# Patient Record
Sex: FEMALE | Race: WHITE
[De-identification: names, ages, dates, MRNs, and addresses within clinical notes are randomized per-mention and may not be internally consistent; named-entity substitution may affect disease eponyms.]

---

## 2019-03-16 ENCOUNTER — HOSPITAL ENCOUNTER (EMERGENCY)
Dept: HOSPITAL 26 - MED | Age: 74
End: 2019-03-16
Payer: COMMERCIAL

## 2019-03-16 VITALS
SYSTOLIC BLOOD PRESSURE: 81 MMHG | BODY MASS INDEX: 13.31 KG/M2 | HEIGHT: 59 IN | DIASTOLIC BLOOD PRESSURE: 33 MMHG | WEIGHT: 66 LBS

## 2019-03-16 VITALS — SYSTOLIC BLOOD PRESSURE: 95 MMHG | DIASTOLIC BLOOD PRESSURE: 38 MMHG

## 2019-03-16 DIAGNOSIS — Z79.899: ICD-10-CM

## 2019-03-16 DIAGNOSIS — R57.1: ICD-10-CM

## 2019-03-16 DIAGNOSIS — Z79.82: ICD-10-CM

## 2019-03-16 DIAGNOSIS — N17.9: Primary | ICD-10-CM

## 2019-03-16 DIAGNOSIS — E07.9: ICD-10-CM

## 2019-03-16 DIAGNOSIS — E87.5: ICD-10-CM

## 2019-03-16 DIAGNOSIS — I10: ICD-10-CM

## 2019-03-16 DIAGNOSIS — E16.2: ICD-10-CM

## 2019-03-16 DIAGNOSIS — E86.0: ICD-10-CM

## 2019-03-16 DIAGNOSIS — F03.90: ICD-10-CM

## 2019-03-16 LAB
ALBUMIN FLD-MCNC: 1.7 G/DL (ref 3.4–5)
ANION GAP SERPL CALCULATED.3IONS-SCNC: 28.1 MMOL/L (ref 8–16)
AST SERPL-CCNC: 31 U/L (ref 15–37)
BASOPHILS # BLD AUTO: 0 K/UL (ref 0–0.22)
BASOPHILS NFR BLD AUTO: 0.1 % (ref 0–2)
BILIRUB SERPL-MCNC: 0.3 MG/DL (ref 0–1)
BUN SERPL-MCNC: 262 MG/DL (ref 7–18)
CHLORIDE SERPL-SCNC: 137 MMOL/L (ref 98–107)
CO2 SERPL-SCNC: 10.2 MMOL/L (ref 21–32)
CREAT SERPL-MCNC: 17.1 MG/DL (ref 0.6–1.3)
EOSINOPHIL # BLD AUTO: 0 K/UL (ref 0–0.4)
EOSINOPHIL NFR BLD AUTO: 0.1 % (ref 0–4)
ERYTHROCYTE [DISTWIDTH] IN BLOOD BY AUTOMATED COUNT: 17.5 % (ref 11.6–13.7)
GFR SERPL CREATININE-BSD FRML MDRD: (no result) ML/MIN (ref 90–?)
GLUCOSE SERPL-MCNC: 224 MG/DL (ref 74–106)
HCT VFR BLD AUTO: 23.9 % (ref 36–48)
HGB BLD-MCNC: 7.3 G/DL (ref 12–16)
LYMPHOCYTES # BLD AUTO: 0.6 K/UL (ref 2.5–16.5)
LYMPHOCYTES NFR BLD AUTO: 9.3 % (ref 20.5–51.1)
MCH RBC QN AUTO: 29 PG (ref 27–31)
MCHC RBC AUTO-ENTMCNC: 30 G/DL (ref 33–37)
MCV RBC AUTO: 94.5 FL (ref 80–94)
MONOCYTES # BLD AUTO: 0.1 K/UL (ref 0.8–1)
MONOCYTES NFR BLD AUTO: 1.4 % (ref 1.7–9.3)
NEUTROPHILS # BLD AUTO: 5.8 K/UL (ref 1.8–7.7)
NEUTROPHILS NFR BLD AUTO: 89.1 % (ref 42.2–75.2)
PLATELET # BLD AUTO: 119 K/UL (ref 140–450)
POTASSIUM SERPL-SCNC: 8.3 MMOL/L (ref 3.5–5.1)
PROTHROMBIN TIME: 12.7 SECS (ref 10.8–13.4)
RBC # BLD AUTO: 2.53 MIL/UL (ref 4.2–5.4)
SODIUM SERPL-SCNC: 167 MMOL/L (ref 136–145)
WBC # BLD AUTO: 6.6 K/UL (ref 4.8–10.8)

## 2019-03-16 PROCEDURE — 71045 X-RAY EXAM CHEST 1 VIEW: CPT

## 2019-03-16 PROCEDURE — 85730 THROMBOPLASTIN TIME PARTIAL: CPT

## 2019-03-16 PROCEDURE — 93005 ELECTROCARDIOGRAM TRACING: CPT

## 2019-03-16 PROCEDURE — 96365 THER/PROPH/DIAG IV INF INIT: CPT

## 2019-03-16 PROCEDURE — 83874 ASSAY OF MYOGLOBIN: CPT

## 2019-03-16 PROCEDURE — 84484 ASSAY OF TROPONIN QUANT: CPT

## 2019-03-16 PROCEDURE — 96361 HYDRATE IV INFUSION ADD-ON: CPT

## 2019-03-16 PROCEDURE — 96368 THER/DIAG CONCURRENT INF: CPT

## 2019-03-16 PROCEDURE — 87040 BLOOD CULTURE FOR BACTERIA: CPT

## 2019-03-16 PROCEDURE — 85610 PROTHROMBIN TIME: CPT

## 2019-03-16 PROCEDURE — 83880 ASSAY OF NATRIURETIC PEPTIDE: CPT

## 2019-03-16 PROCEDURE — 80053 COMPREHEN METABOLIC PANEL: CPT

## 2019-03-16 PROCEDURE — 82550 ASSAY OF CK (CPK): CPT

## 2019-03-16 PROCEDURE — 83605 ASSAY OF LACTIC ACID: CPT

## 2019-03-16 PROCEDURE — 85025 COMPLETE CBC W/AUTO DIFF WBC: CPT

## 2019-03-16 PROCEDURE — 96376 TX/PRO/DX INJ SAME DRUG ADON: CPT

## 2019-03-16 PROCEDURE — 82553 CREATINE MB FRACTION: CPT

## 2019-03-16 PROCEDURE — 96366 THER/PROPH/DIAG IV INF ADDON: CPT

## 2019-03-16 PROCEDURE — 99291 CRITICAL CARE FIRST HOUR: CPT

## 2019-03-16 PROCEDURE — 96375 TX/PRO/DX INJ NEW DRUG ADDON: CPT

## 2019-03-16 PROCEDURE — 36415 COLL VENOUS BLD VENIPUNCTURE: CPT

## 2019-03-16 NOTE — NUR
three attempts made for abg unable to obtain at this time, rebecca noble and dr. hernandez 
notified

## 2019-03-16 NOTE — NUR
spoke with Carol Ann with 's office---unable to give guess to time of when 
 may make contact but did inform we were next for contact.  verified 
correct number

## 2019-03-16 NOTE — NUR
AGONAL RESPIRATIONS NOTED---HR DECREASED TO 50'S; PT WITH MOVEMENTS THREADY 
PULSE.

 SPOKE WITH PT'S SON AND DAUGHTER BOTH AGREED TO DNR 

MD SPOKE WITH FAMILY ABOUT OPTION TO INTUBATE BUT THEY BOTH AGREED NO 
INTUBATION.

## 2019-03-16 NOTE — NUR
MARCELINO FROM [facility] CALLED FOR BODY  IN 2 HOURS. PRIMARY 
NURSE MADE AWARE